# Patient Record
Sex: MALE | Race: WHITE | NOT HISPANIC OR LATINO
[De-identification: names, ages, dates, MRNs, and addresses within clinical notes are randomized per-mention and may not be internally consistent; named-entity substitution may affect disease eponyms.]

---

## 2023-09-08 ENCOUNTER — APPOINTMENT (OUTPATIENT)
Dept: NEUROLOGY | Facility: CLINIC | Age: 33
End: 2023-09-08
Payer: COMMERCIAL

## 2023-09-08 ENCOUNTER — NON-APPOINTMENT (OUTPATIENT)
Age: 33
End: 2023-09-08

## 2023-09-08 VITALS
BODY MASS INDEX: 28.79 KG/M2 | HEART RATE: 71 BPM | SYSTOLIC BLOOD PRESSURE: 139 MMHG | HEIGHT: 68 IN | TEMPERATURE: 98.1 F | DIASTOLIC BLOOD PRESSURE: 78 MMHG | OXYGEN SATURATION: 98 % | WEIGHT: 190 LBS

## 2023-09-08 DIAGNOSIS — G43.909 MIGRAINE, UNSPECIFIED, NOT INTRACTABLE, W/OUT STATUS MIGRAINOSUS: ICD-10-CM

## 2023-09-08 DIAGNOSIS — Z83.3 FAMILY HISTORY OF DIABETES MELLITUS: ICD-10-CM

## 2023-09-08 DIAGNOSIS — M54.2 CERVICALGIA: ICD-10-CM

## 2023-09-08 DIAGNOSIS — Z82.49 FAMILY HISTORY OF ISCHEMIC HEART DISEASE AND OTHER DISEASES OF THE CIRCULATORY SYSTEM: ICD-10-CM

## 2023-09-08 DIAGNOSIS — Z80.8 FAMILY HISTORY OF MALIGNANT NEOPLASM OF OTHER ORGANS OR SYSTEMS: ICD-10-CM

## 2023-09-08 DIAGNOSIS — Z80.3 FAMILY HISTORY OF MALIGNANT NEOPLASM OF BREAST: ICD-10-CM

## 2023-09-08 PROBLEM — Z00.00 ENCOUNTER FOR PREVENTIVE HEALTH EXAMINATION: Status: ACTIVE | Noted: 2023-09-08

## 2023-09-08 PROCEDURE — 99203 OFFICE O/P NEW LOW 30 MIN: CPT

## 2023-09-08 RX ORDER — LEVOTHYROXINE SODIUM 150 UG/1
150 TABLET ORAL DAILY
Refills: 0 | Status: ACTIVE | COMMUNITY

## 2023-09-08 NOTE — HISTORY OF PRESENT ILLNESS
[FreeTextEntry1] : Patient is a 33 year old male with PMH of Migraines (usually exercise induced) and Hypothyroidism presenting as a new patient for headaches.  Exercise induced migraines since 20 years old. He has tightness on the right side of the neck, back, and shoulder has been going on the for the past 10 years. Headaches are triggered by bright lights after exercising. The headaches can last for 12 hours to a week and he might lose his vision for 45 minutes. The headache moves from right occipital to the right apex. He would take 1 Advil 2-3x a week as needed for the headache. If he doesn't premedicate with Advil prior to exercise, he might get headaches about 1-2x per week but now that he's been premedicating, He doesn't get it that often.  Years ago he saw a neurologist and was recommended to start an antiepileptic medication. About 2-3x per year he would throw his back out only on the right side. Doesn't go to chiropractors.

## 2023-09-08 NOTE — ASSESSMENT
[FreeTextEntry1] : Patient is a 33 year old male with PMH of Migraines (usually exercise induced) and Hypothyroidism presenting as a new patient for headaches.  Plan - MRI Head - MRI C-Spine

## 2023-09-08 NOTE — PHYSICAL EXAM
[FreeTextEntry1] : The patient is alert and oriented x3, follows commands, and is able to participate fully in the history taking. Speech is normal with no evidence of dysarthria. Memory is intact: Immediate recall 3 out of 3, short-term 3 out of 3, remote memory intact. Cranial nerves II through XII intact. Motor exam: Upper and lower extremities 5/5, normal tone. No abnormal movements noted. Sensory exam: Intact to light touch and pinprick. Romberg negative. Coordination and vestibular exam: Finger to nose intact, no evidence of ataxia or nystagmus. Gait: Normal stance and gait. Reflexes: One to 2+ in upper and lower extremities. No pathological reflexes. Downgoing toes.

## 2023-10-11 ENCOUNTER — TRANSCRIPTION ENCOUNTER (OUTPATIENT)
Age: 33
End: 2023-10-11

## 2023-10-11 ENCOUNTER — APPOINTMENT (OUTPATIENT)
Dept: MRI IMAGING | Facility: HOSPITAL | Age: 33
End: 2023-10-11
Payer: COMMERCIAL

## 2023-10-11 ENCOUNTER — OUTPATIENT (OUTPATIENT)
Dept: OUTPATIENT SERVICES | Facility: HOSPITAL | Age: 33
LOS: 1 days | End: 2023-10-11
Payer: COMMERCIAL

## 2023-10-11 PROCEDURE — 72141 MRI NECK SPINE W/O DYE: CPT | Mod: 26

## 2023-10-11 PROCEDURE — 70551 MRI BRAIN STEM W/O DYE: CPT

## 2023-10-11 PROCEDURE — 70551 MRI BRAIN STEM W/O DYE: CPT | Mod: 26

## 2023-10-11 PROCEDURE — 72141 MRI NECK SPINE W/O DYE: CPT

## 2023-10-16 ENCOUNTER — TRANSCRIPTION ENCOUNTER (OUTPATIENT)
Age: 33
End: 2023-10-16